# Patient Record
Sex: MALE | Race: WHITE | NOT HISPANIC OR LATINO | URBAN - NONMETROPOLITAN AREA
[De-identification: names, ages, dates, MRNs, and addresses within clinical notes are randomized per-mention and may not be internally consistent; named-entity substitution may affect disease eponyms.]

---

## 2020-07-21 ENCOUNTER — OFFICE VISIT (OUTPATIENT)
Dept: UROLOGY | Facility: CLINIC | Age: 35
End: 2020-07-21

## 2020-07-21 VITALS
HEART RATE: 59 BPM | WEIGHT: 145 LBS | DIASTOLIC BLOOD PRESSURE: 80 MMHG | SYSTOLIC BLOOD PRESSURE: 112 MMHG | OXYGEN SATURATION: 99 % | TEMPERATURE: 98 F

## 2020-07-21 DIAGNOSIS — E29.1 HYPOGONADISM MALE: Primary | ICD-10-CM

## 2020-07-21 PROCEDURE — 99204 OFFICE O/P NEW MOD 45 MIN: CPT | Performed by: UROLOGY

## 2020-07-21 NOTE — PROGRESS NOTES
Chief Complaint  Low Testosterone        HPI  Viki is a 35 y.o. male who is referred for evaluation of hypogonadism and low testosterone level.  He states that for the past several months he has noticed a significant drop in his energy strength and stamina of his recently found a testosterone level significantly less than 300.  He denies any ingestion of opioids that might possibly have been the etiology.    Vitals:    07/21/20 1336   BP: 112/80   Pulse: 59   Temp: 98 °F (36.7 °C)   SpO2: 99%       Past Medical History  History reviewed. No pertinent past medical history.    Past Surgical History  Past Surgical History:   Procedure Laterality Date   • KNEE ACL RECONSTRUCTION         Medications  currently has no medications in their medication list.      Allergies  No Known Allergies    Social History  Social History     Socioeconomic History   • Marital status: Unknown     Spouse name: Not on file   • Number of children: Not on file   • Years of education: Not on file   • Highest education level: Not on file   Tobacco Use   • Smoking status: Never Smoker   • Smokeless tobacco: Never Used   Substance and Sexual Activity   • Alcohol use: Not Currently     Frequency: Never   • Drug use: Defer   • Sexual activity: Defer       Family History  He has no family history of bladder or kidney cancer  He has no family history of kidney stones      AUA Symptom Score:      Review of Systems  Review of Systems   Constitutional: Positive for fatigue. Negative for activity change, appetite change, chills, fever, unexpected weight gain and unexpected weight loss.   Respiratory: Negative for apnea, cough, chest tightness, shortness of breath, wheezing and stridor.    Cardiovascular: Negative for chest pain, palpitations and leg swelling.   Gastrointestinal: Negative for abdominal distention, abdominal pain, anal bleeding, blood in stool, constipation, diarrhea, nausea, rectal pain, vomiting, GERD and indigestion.   Genitourinary:  Negative for decreased libido, decreased urine volume, difficulty urinating, discharge, dysuria, flank pain, frequency, genital sores, hematuria, nocturia, penile pain, erectile dysfunction, penile swelling, scrotal swelling, testicular pain, urgency and urinary incontinence.   Musculoskeletal: Negative for back pain and joint swelling.   Neurological: Negative for tremors, seizures, speech difficulty, weakness and numbness.   Psychiatric/Behavioral: Negative for agitation, decreased concentration, sleep disturbance, depressed mood and stress. The patient is not nervous/anxious.        Physical Exam  Physical Exam   Constitutional: He is oriented to person, place, and time. He appears well-developed and well-nourished.   HENT:   Head: Normocephalic and atraumatic.   Neck: Normal range of motion.   Pulmonary/Chest: Effort normal. No respiratory distress.   Abdominal: Soft. He exhibits no distension and no mass. There is no tenderness. No hernia. Hernia confirmed negative in the right inguinal area and confirmed negative in the left inguinal area.   Genitourinary: Testes normal and penis normal. Circumcised.   Musculoskeletal: Normal range of motion.   Lymphadenopathy:     He has no cervical adenopathy. No inguinal adenopathy noted on the right or left side.   Neurological: He is alert and oriented to person, place, and time.   Skin: Skin is warm and dry.   Psychiatric: He has a normal mood and affect. His behavior is normal.   Vitals reviewed.      Labs Recent and today in the office:  No results found for this or any previous visit.      Assessment & Plan  Hypogonadism: I recommended a baseline analysis of his pituitary gonadal axis and a trial of Clomid to see if we can stimulate endogenous production of testosterone.  He has 2 children but at his age may want more and testosterone will suppress his sperm count as well as endogenous production.  The patient is desperate to see a quick result so we will go with a  full 50 mg daily rather than usual 25 with a progression after 3 months.  If this is ineffective or undesirable for any reason as well as expense he may elect to switch over to testosterone supplement when he returns in 3 months.  The necessity of close monitoring for toxicity of therapy is reviewed in detail along with the possibilities of treating estradiol conversion or polycythemia.

## 2020-07-23 LAB
ERYTHROCYTE [DISTWIDTH] IN BLOOD BY AUTOMATED COUNT: 12.3 % (ref 12.3–15.4)
ESTRADIOL SERPL-MCNC: 14.2 PG/ML (ref 7.6–42.6)
HCT VFR BLD AUTO: 40 % (ref 37.5–51)
HGB BLD-MCNC: 13.8 G/DL (ref 13–17.7)
LH SERPL-ACNC: 5.2 MIU/ML (ref 1.7–8.6)
MCH RBC QN AUTO: 28.9 PG (ref 26.6–33)
MCHC RBC AUTO-ENTMCNC: 34.5 G/DL (ref 31.5–35.7)
MCV RBC AUTO: 83.9 FL (ref 79–97)
PLATELET # BLD AUTO: 338 10*3/MM3 (ref 140–450)
PROLACTIN SERPL-MCNC: 8 NG/ML (ref 4–15.2)
RBC # BLD AUTO: 4.77 10*6/MM3 (ref 4.14–5.8)
TESTOST FREE SERPL-MCNC: 10.2 PG/ML (ref 8.7–25.1)
TESTOST SERPL-MCNC: 393 NG/DL (ref 264–916)
WBC # BLD AUTO: 6.48 10*3/MM3 (ref 3.4–10.8)

## 2021-01-12 ENCOUNTER — TRANSCRIBE ORDERS (OUTPATIENT)
Dept: ADMINISTRATIVE | Facility: HOSPITAL | Age: 36
End: 2021-01-12

## 2021-01-12 DIAGNOSIS — S83.207A ACUTE MENISCAL TEAR OF KNEE, LEFT, INITIAL ENCOUNTER: Primary | ICD-10-CM

## 2024-02-19 ENCOUNTER — TRANSCRIBE ORDERS (OUTPATIENT)
Dept: ADMINISTRATIVE | Facility: HOSPITAL | Age: 39
End: 2024-02-19
Payer: COMMERCIAL

## 2024-02-19 DIAGNOSIS — R19.7 DIARRHEA, UNSPECIFIED TYPE: ICD-10-CM

## 2024-02-19 DIAGNOSIS — R10.84 ABDOMINAL PAIN, GENERALIZED: Primary | ICD-10-CM

## 2024-02-19 DIAGNOSIS — Z11.3 SCREEN FOR STD (SEXUALLY TRANSMITTED DISEASE): ICD-10-CM

## 2024-04-10 ENCOUNTER — HOSPITAL ENCOUNTER (OUTPATIENT)
Dept: ULTRASOUND IMAGING | Facility: HOSPITAL | Age: 39
Discharge: HOME | End: 2024-04-10
Payer: COMMERCIAL

## 2024-04-10 DIAGNOSIS — Z11.3 SCREEN FOR STD (SEXUALLY TRANSMITTED DISEASE): ICD-10-CM

## 2024-04-10 DIAGNOSIS — R19.7 DIARRHEA, UNSPECIFIED TYPE: ICD-10-CM

## 2024-04-10 DIAGNOSIS — R10.84 ABDOMINAL PAIN, GENERALIZED: ICD-10-CM

## 2024-04-10 PROCEDURE — 76700 US EXAM ABDOM COMPLETE: CPT

## 2024-05-06 ENCOUNTER — OFFICE VISIT (OUTPATIENT)
Dept: SURGERY | Facility: CLINIC | Age: 39
End: 2024-05-06
Payer: COMMERCIAL

## 2024-05-06 VITALS
BODY MASS INDEX: 24.99 KG/M2 | TEMPERATURE: 97.8 F | SYSTOLIC BLOOD PRESSURE: 116 MMHG | WEIGHT: 150 LBS | DIASTOLIC BLOOD PRESSURE: 70 MMHG | HEIGHT: 65 IN | OXYGEN SATURATION: 98 % | HEART RATE: 67 BPM

## 2024-05-06 DIAGNOSIS — K52.9 CHRONIC DIARRHEA: ICD-10-CM

## 2024-05-06 DIAGNOSIS — R10.11 RIGHT UPPER QUADRANT ABDOMINAL PAIN: ICD-10-CM

## 2024-05-06 DIAGNOSIS — K80.20 CALCULUS OF GALLBLADDER WITHOUT CHOLECYSTITIS WITHOUT OBSTRUCTION: Primary | ICD-10-CM

## 2024-05-06 PROCEDURE — 99204 OFFICE O/P NEW MOD 45 MIN: CPT | Performed by: SURGERY

## 2024-05-31 ENCOUNTER — TELEPHONE (OUTPATIENT)
Dept: SURGERY | Facility: CLINIC | Age: 39
End: 2024-05-31
Payer: COMMERCIAL

## 2024-06-18 ENCOUNTER — OUTSIDE FACILITY SERVICE (OUTPATIENT)
Dept: SURGERY | Facility: CLINIC | Age: 39
End: 2024-06-18
Payer: COMMERCIAL

## 2024-06-18 DIAGNOSIS — K81.9 CHOLECYSTITIS: Primary | ICD-10-CM

## 2024-06-18 RX ORDER — HYDROCODONE BITARTRATE AND ACETAMINOPHEN 5; 325 MG/1; MG/1
1 TABLET ORAL EVERY 6 HOURS PRN
Qty: 14 TABLET | Refills: 0 | Status: SHIPPED | OUTPATIENT
Start: 2024-06-18

## 2024-07-01 ENCOUNTER — OFFICE VISIT (OUTPATIENT)
Dept: SURGERY | Facility: CLINIC | Age: 39
End: 2024-07-01
Payer: COMMERCIAL

## 2024-07-01 VITALS
SYSTOLIC BLOOD PRESSURE: 106 MMHG | TEMPERATURE: 97.8 F | BODY MASS INDEX: 24.99 KG/M2 | OXYGEN SATURATION: 98 % | WEIGHT: 150 LBS | HEART RATE: 92 BPM | HEIGHT: 65 IN | DIASTOLIC BLOOD PRESSURE: 62 MMHG

## 2024-07-01 DIAGNOSIS — Z48.89 POSTOPERATIVE VISIT: Primary | ICD-10-CM

## 2024-07-01 PROCEDURE — 99024 POSTOP FOLLOW-UP VISIT: CPT | Performed by: SURGERY

## 2024-09-09 ENCOUNTER — OFFICE VISIT (OUTPATIENT)
Dept: PSYCHIATRY | Facility: CLINIC | Age: 39
End: 2024-09-09
Payer: COMMERCIAL

## 2024-09-09 VITALS
HEIGHT: 65 IN | HEART RATE: 76 BPM | BODY MASS INDEX: 23.49 KG/M2 | SYSTOLIC BLOOD PRESSURE: 122 MMHG | WEIGHT: 141 LBS | OXYGEN SATURATION: 98 % | DIASTOLIC BLOOD PRESSURE: 84 MMHG

## 2024-09-09 DIAGNOSIS — F51.01 PRIMARY INSOMNIA: ICD-10-CM

## 2024-09-09 DIAGNOSIS — Z79.899 MEDICATION MANAGEMENT: ICD-10-CM

## 2024-09-09 DIAGNOSIS — F90.2 ATTENTION DEFICIT HYPERACTIVITY DISORDER, COMBINED TYPE: Primary | ICD-10-CM

## 2024-09-09 PROCEDURE — 90792 PSYCH DIAG EVAL W/MED SRVCS: CPT | Performed by: REGISTERED NURSE

## 2024-09-09 RX ORDER — METHYLPHENIDATE HYDROCHLORIDE 20 MG/1
CAPSULE ORAL
Qty: 42 CAPSULE | Refills: 0 | Status: SHIPPED | OUTPATIENT
Start: 2024-09-09 | End: 2024-09-10 | Stop reason: SDUPTHER

## 2024-09-09 NOTE — PROGRESS NOTES
New Patient Office Visit      Patient Name: Evan Drew  : 1985   MRN: [id number]     Referring Provider: Provider, No Known    Chief Complaint:      ICD-10-CM ICD-9-CM   1. Attention deficit hyperactivity disorder, combined type  F90.2 314.01   2. Primary insomnia  F51.01 307.42   3. Medication management  Z79.899 V58.69        History of Present Illness:   Evan Drew is a 39 y.o. male who is here today for initial evaluation and to establish care for possible ADHD.  Patient states that over the last few years he has lost multiple jobs for missing work or being late due to unable to keep a schedule or stay motivated.  He is now currently working at the [employer] where the schedule is more flexible due to not having to clock in.  However, patient states he has been last 20 days out of the last 30 and was called out about it.  He also states that when he gets to work he will forget his name zac and have to go back home to get it.  He is having significant trouble with comprehension, having decreased focus and concentration, unable to complete task, unable to stay asleep or fall asleep easily, and states it is extremely difficult to get up in the mornings and get motivated to get his day started.  A couple of years ago he was on Adderall and stated this worked well but then went off of this medication and has tried to control his symptoms without medication.  However, he states it is impacting his work and family life.  He went through a divorce a few years ago and is now dating his significant other who he wants to propose to later this month.  He states that at times she will be talking to him and he will zone out and not comprehend anything that she said and this is causing her to have aggravation toward him.  He is concerned about this as well.  He tends to be impulsive with betting on sports and money management.  He denies any history of or recent SI/HI/AVH. Due to his reported symptoms he  took the CPT 3 on today's visit.  His CPT 3 results showed that he had a very high likelihood of having a disorder characterized by ADHD.  After discussing medication and treatment options for patient's symptoms it was decided to prescribe patient Jornay 20 mg every night x 14 days then increase to 40 mg every night thereafter for his ADHD symptoms.  This is in hopes to help patient be more motivated in the mornings and be able to get up easier to be on time for work and be more motivated at work.  This hopefully also will help patient stay asleep at night and get more rest to not be as fatigued the next day.  Patient denies any history of or current SI/HI/AVH at this time.  Patient was instructed on mechanism of action and side effects of medication when to seek medical treatment.  Patient was also instructed to monitor his appetite as stimulants can suppress his appetite and he is not in need of losing weight.  He was also instructed to monitor his blood pressure and heart rate while taking this medication due to the medication can increase his blood pressure and heart rate.  Patient verbalized understanding.  Will follow up with patient in 4 weeks for medication and symptom management.    Therapy: Not currently in therapy at this time and denies the need to be in therapy    Subjective      Review of Systems:   Review of Systems   Psychiatric/Behavioral:  Positive for decreased concentration, sleep disturbance and stress. The patient is nervous/anxious.        Past Medical History:   History reviewed. No pertinent past medical history.    Past Surgical History:   Past Surgical History:   Procedure Laterality Date    KNEE ACL RECONSTRUCTION      TONSILLECTOMY  1997       Family History:   Family History   Problem Relation Age of Onset    ADD / ADHD Maternal Uncle     ADD / ADHD Cousin        Family Psychiatric History:  ADHD, anxiety, depression    Screening Scores:   PHQ-9 Total Score: 5  TERI-7  Feeling nervous,  anxious or on edge: Not at all  Not being able to stop or control worrying: Not at all  Worrying too much about different things: Not at all  Trouble Relaxing: Several days  Being so restless that it is hard to sit still: Several days  Feeling afraid as if something awful might happen: Not at all  Becoming easily annoyed or irritable: Not at all  TERI 7 Total Score: 2  If you checked any problems, how difficult have these problems made it for you to do your work, take care of things at home, or get along with other people: Somewhat difficult    Psychiatric History:   Previous medications tried: Adderall  Inpatient admissions: Denies  History of suicide/self-harm attempts: Denies  Family history of suicide or attempts: Denies  Trauma/Abuse History: Denies any trauma or abuse history  Developmental History: States he had a normal development as a child    RISK ASSESSMENT:  Does patient currently have intent, plan, ideation for suicide?  Denies  Access to firearms or weapons: Yes but uses safety and precautions  History of homicidal ideation: Denies  Risk Taking/Impulsive Behavior (current or past): Yes describe: Impulsive with betting on sports  Protective factors: Family    Social History:  Highest level of education obtained: High school  Living situation: Currently lives in a home alone but gets his children on the weekends  Patient's Occupation: He is currently working on the [employer] side at the [employer]  Leisure and Recreation: Play sports, watch sports, going to festivals, hanging out with the kids and his soon-to-be fiancé  Support system: Mom, dad, brother  Illicit substance use: Denies  Alcohol use: Denies  Tobacco use: Denies    Legal History:   None    Medications:     Current Outpatient Medications:     Methylphenidate HCl ER, PM, (Jornay PM) 20 MG capsule sustained-release 24 hr, Take 1 capsule by mouth Every Night for 14 days, THEN 2 capsules Every Night for 14 days., Disp: 42 capsule, Rfl:  "0    Medication Considerations:  JANET reviewed and appropriate.      Allergies:   No Known Allergies    Objective     Physical Exam:  Vital Signs:   Vitals:    09/09/24 1047   BP: 122/84   Pulse: 76   SpO2: 98%   Weight: 64 kg (141 lb)   Height: 165.1 cm (65\")     Body mass index is 23.46 kg/m².     Mental Status Exam:   MENTAL STATUS EXAM   General Appearance:  Cleanly groomed and dressed  Eye Contact:  Good eye contact  Attitude:  Cooperative  Motor Activity:  Normal gait, posture and fidgety  Muscle Strength:  Normal  Speech:  Normal rate, tone, volume and hyper talkative  Language:  Spontaneous  Mood and affect:  Normal, pleasant and anxious  Hopelessness:  Denies  Loneliness: Denies  Thought Process:  Logical  Associations/ Thought Content:  No delusions  Hallucinations:  None  Suicidal Ideations:  Not present  Homicidal Ideation:  Not present  Sensorium:  Alert  Orientation:  Person, place, time and situation  Immediate Recall, Recent, and Remote Memory:  Intact  Attention Span/ Concentration:  Easily distracted  Fund of Knowledge:  Appropriate for age and educational level  Intellectual Functioning:  Average range  Insight:  Good  Judgement:  Good  Reliability:  Fair  Impulse Control:  Fair       Assessment / Plan      Visit Diagnosis/Orders Placed This Visit:  Diagnoses and all orders for this visit:    1. Attention deficit hyperactivity disorder, combined type (Primary)  -     Methylphenidate HCl ER, PM, (Jornay PM) 20 MG capsule sustained-release 24 hr; Take 1 capsule by mouth Every Night for 14 days, THEN 2 capsules Every Night for 14 days.  Dispense: 42 capsule; Refill: 0  -     Compliance Drug Analysis, Ur - Urine, Clean Catch    2. Primary insomnia    3. Medication management  -     Compliance Drug Analysis, Ur - Urine, Clean Catch         Functional Status: Moderate impairment    Prognosis: Good with ongoing treatment    Impression/Formulation:  Patient appeared alert and oriented.  Patient is " voluntarily requesting to begin psychiatric medication management at [facility].  Patient is receptive to assistance with maintaining a stable lifestyle.  Patient presents with history of     ICD-10-CM ICD-9-CM   1. Attention deficit hyperactivity disorder, combined type  F90.2 314.01   2. Primary insomnia  F51.01 307.42   3. Medication management  Z79.899 V58.69   .     Treatment Plan:   -Prescribed Jornay p.m. 20 mg nightly x 14 days then increase to 40 mg every night for ADHD.  Medication to be taken at bedtime.  -Obtained urine drug screen and controlled substance agreement  -Reviewed CPT 3 exam results that patient took on today's visit  -Follow up in 4 weeks      The JANET report, reviewed through PDMP, of the past 12 months were reviewed and is appropriate.  The patient/guardian reports taking the medication only as prescribed.  The patient/guardian denies any abuse or misuse of the medication.  The patient/guardian denies any other substance use or issues.  There are no apparent substance related issues.  The patient reports no side effects of the current medication usage.  The patient/guardian has reported significant improvement with medication usage and wishes to continue medication as prescribed.  The patient/guardian is appropriate to continue with current medication usage at this time.  Reinforced risks and side effects of medication usage, patient and/or guardian verbalize understanding in their own words and are in agreement with current plan.    The patient is being prescribed a controlled substance as part of the treatment plan. The patient/guardian has been educated of appropriate use of the medication(s), including risks and side effects such as insomnia, headache, exacerbation of tics, nervousness, irritability, overstimulation, tremor, dizziness, anorexia or change in appetite, nausea, dry mouth, constipation, diarrhea, weight loss, sexual dysfunction, psychotic  episodes, seizures, palpitations, tachycardia, hypertension, rare activation (activation of hypomania, francisco, and/or suicidal ideations), cardiovascular adverse effects including sudden death (especially patients with pre-existing structural abnormalities often associated with a family history of cardiac disease), may slow normal growth in children, weight gain is reported but not expected, sedation is possible but activation is much more common, metabolic adversities, and overdose among others. Patient/guardian is also informed that the medication is to be used by the patient only, the medication is to be used only as directed, and the medication should not be combined with other substances unless directed by a Provider/Prescriber. The patient/guardian verbalized understanding and agreement with this in their own words, and wish to continue with current treatment plan.    GOALS:  Short Term Goals: Patient will be compliant with medication, and patient will have no significant medication related side effects.  Patient will be engaged in psychotherapy as indicated.  Patient will report subjective improvement of symptoms.  Long term goals: To stabilize mood and treat/improve subjective symptoms, the patient will stay out of the hospital, the patient will be at an optimal level of functioning, and the patient will take all medications as prescribed.  The patient/guardian verbalized understanding and agreement with goals that were mutually set.     Patient will continue supportive psychotherapy efforts and medications as indicated. Clinic will obtain release of information for current treatment team for continuity of care as needed. Patient will contact this office, call 911 or present to the nearest emergency room should suicidal or homicidal ideations occur. Discussed medication options and treatment plan of prescribed medication(s) as well as the risks, benefits, and potential side effects. Patient acknowledged and  verbally consented to continue with current treatment plan and was educated on the importance of compliance with treatment and follow-up appointments.     Follow Up:   Return in about 4 weeks (around 10/7/2024) for Med Check.        JENNIFER Marmolejo  [facility]     This is electronically signed by JENNIFER Marmolejo  [unfilled] 11:56 EDT

## 2024-09-10 RX ORDER — METHYLPHENIDATE HYDROCHLORIDE 40 MG/1
40 CAPSULE ORAL NIGHTLY
Qty: 30 CAPSULE | Refills: 0 | Status: SHIPPED | OUTPATIENT
Start: 2024-09-25

## 2024-09-10 RX ORDER — METHYLPHENIDATE HYDROCHLORIDE 20 MG/1
20 CAPSULE ORAL NIGHTLY
Qty: 14 CAPSULE | Refills: 0 | Status: SHIPPED | OUTPATIENT
Start: 2024-09-10 | End: 2024-09-24

## 2024-09-16 LAB — DRUGS UR: NORMAL

## 2024-09-23 DIAGNOSIS — F90.2 ATTENTION DEFICIT HYPERACTIVITY DISORDER, COMBINED TYPE: ICD-10-CM

## 2024-09-23 RX ORDER — METHYLPHENIDATE HYDROCHLORIDE 40 MG/1
40 CAPSULE ORAL NIGHTLY
Qty: 30 CAPSULE | Refills: 0 | Status: SHIPPED | OUTPATIENT
Start: 2024-09-25

## 2024-10-07 ENCOUNTER — TELEMEDICINE (OUTPATIENT)
Dept: PSYCHIATRY | Facility: CLINIC | Age: 39
End: 2024-10-07
Payer: COMMERCIAL

## 2024-10-07 DIAGNOSIS — F41.1 GENERALIZED ANXIETY DISORDER: ICD-10-CM

## 2024-10-07 DIAGNOSIS — R53.83 FATIGUE, UNSPECIFIED TYPE: ICD-10-CM

## 2024-10-07 DIAGNOSIS — F90.2 ATTENTION DEFICIT HYPERACTIVITY DISORDER, COMBINED TYPE: Primary | ICD-10-CM

## 2024-10-07 PROCEDURE — 99214 OFFICE O/P EST MOD 30 MIN: CPT | Performed by: REGISTERED NURSE

## 2024-10-07 PROCEDURE — 96127 BRIEF EMOTIONAL/BEHAV ASSMT: CPT | Performed by: REGISTERED NURSE

## 2024-10-07 NOTE — PROGRESS NOTES
Video Visit      Patient Name: Evan Drew  : 1985   MRN: [id number]     Referring Provider: Provider, No Known    Chief Complaint:      ICD-10-CM ICD-9-CM   1. Attention deficit hyperactivity disorder, combined type  F90.2 314.01   2. Primary insomnia  F51.01 307.42        This provider is located at the [facility] (through [facility]), [address], [place] [address] using a secure [employer] Video Visit through [employer]. Patient is being seen remotely via telehealth video visit at their home address in [place], and stated they are in a secure environment for this session. The patient's condition being diagnosed/treated is appropriate for telemedicine. The provider identified herself as well as her credentials. The patient, and/or patients guardian, consent to be seen remotely, and when consent is given they understand that the consent allows for patient identifiable information to be sent to a third party as needed. They may refuse to be seen remotely at any time. The electronic data is encrypted and password protected, and the patient and/or guardian has been advised of the potential risks to privacy not withstanding such measures.    The patient has chosen to receive care today through a telehealth video visit. Do you consent to use a video/audio connection for your medical care today? Yes    History of Present Illness:   Evan Drew is a 39 y.o. male who is being seen by a video visit today for follow-up for medication and symptom management.  Patient states that he feels the Jornay 40 mg is going okay but that he stays tired during the day.  He does not notice any burst of energy or having more energy than normal.  He does state that he is able to get up and get to work on time but still continues to feel sluggish.  He states he has noticed a little improvement with his concentration and focus due to not having as significant  anxiety as he was before.  He has noticed little changes and improvements with the medication but nothing significant.  He denies having any adverse reactions to the medication or any increase in his heart rate or blood pressure.  He does state that he is still eating the same and being conscious that he does due to not wanting to lose further weight.  Instructed that he needs to continue with adequate nutrition and hydration.  He states that he is sleeping okay with the medication.  He denies any SI/HI/AVH.  He does state they have been extremely busy at work and this has helped keep his mind busy.  After discussing medication and treatment options it was decided to increase the Jornay to 60 mg capsule every night to see if this further improves his symptoms and to possibly help keep him from being so fatigued during the day.  Instructed the patient that if this increase in dose does not work that we will discuss changing his stimulant to a different medication at the next visit.  Instructed again on the adverse reactions and side effects of this medication and when to seek medical treatment.  Instructed with any suicidal thoughts with intent or plan to go to the nearest emergency room.  Patient instructed to continue to be mindful of eating and to continue to monitor his blood pressure and heart rate with this medication.  Patient verbalized understanding to all.  Patient will follow up with this provider in 6 weeks or sooner if needed for medication symptom management.  If patient has lost further weight on his next in person visit, medication may be discontinued.    Subjective      Review of Systems:   Review of Systems   Constitutional:  Positive for fatigue.   Psychiatric/Behavioral:  Positive for decreased concentration and sleep disturbance.        Screening Scores:   PHQ-9 Total Score: 3  TERI-7  Feeling nervous, anxious or on edge: Not at all  Not being able to stop or control worrying: Not at all  Worrying  too much about different things: Not at all  Trouble Relaxing: Not at all  Being so restless that it is hard to sit still: Not at all  Feeling afraid as if something awful might happen: Not at all  Becoming easily annoyed or irritable: Not at all  TERI 7 Total Score: 0  If you checked any problems, how difficult have these problems made it for you to do your work, take care of things at home, or get along with other people: Not difficult at all      RISK ASSESSMENT:  Patient denies any thoughts of suicide or intent today. Patient denies any suicidal or homicidal ideation today. Patient denies any high risk factors today.     Medications:     Current Outpatient Medications:     Methylphenidate HCl ER, PM, 60 MG capsule sustained-release 24 hr, Take 60 mg by mouth Every Night., Disp: 30 capsule, Rfl: 0    Medication Considerations:  JANET reviewed and appropriate.      Allergies:   No Known Allergies    Objective     Physical Exam:  Vital Signs: There were no vitals filed for this visit.  There is no height or weight on file to calculate BMI.     Mental Status Exam:   MENTAL STATUS EXAM   General Appearance:  Cleanly groomed and dressed  Eye Contact:  Good eye contact  Attitude:  Cooperative  Speech:  Normal rate, tone, volume  Language:  Spontaneous  Mood and affect:  Normal, pleasant  Hopelessness:  Denies  Loneliness: Denies  Thought Process:  Logical  Associations/ Thought Content:  No delusions  Hallucinations:  None  Suicidal Ideations:  Not present  Homicidal Ideation:  Not present  Sensorium:  Alert  Orientation:  Person, place, time and situation  Immediate Recall, Recent, and Remote Memory:  Intact  Attention Span/ Concentration:  Easily distracted  Fund of Knowledge:  Appropriate for age and educational level  Intellectual Functioning:  Average range  Insight:  Good  Judgement:  Good  Reliability:  Good  Impulse Control:  Fair         Assessment / Plan      Visit Diagnosis/Orders Placed This  Visit:  Diagnoses and all orders for this visit:    1. Attention deficit hyperactivity disorder, combined type (Primary)  -     Methylphenidate HCl ER, PM, 60 MG capsule sustained-release 24 hr; Take 60 mg by mouth Every Night.  Dispense: 30 capsule; Refill: 0    2. Primary insomnia         Functional Status: Moderate impairment    Prognosis: Good with ongoing treatment    Impression/Formulation:  Patient appeared alert and oriented.  Patient is voluntarily requesting to begin outpatient therapy at [facility].  Patient is receptive to assistance with maintaining a stable lifestyle.  Patient presents with history of     ICD-10-CM ICD-9-CM   1. Attention deficit hyperactivity disorder, combined type  F90.2 314.01   2. Primary insomnia  F51.01 307.42   .     Treatment Plan:   -Increase Jornay to 60 mg capsule at night  -Follow up in 6 weeks or sooner if needed      The JANET report, reviewed through PDMP, of the past 12 months were reviewed and is appropriate.  The patient/guardian reports taking the medication only as prescribed.  The patient/guardian denies any abuse or misuse of the medication.  The patient/guardian denies any other substance use or issues.  There are no apparent substance related issues.  The patient reports no side effects of the current medication usage.  The patient/guardian has reported significant improvement with medication usage and wishes to continue medication as prescribed.  The patient/guardian is appropriate to continue with current medication usage at this time.  Reinforced risks and side effects of medication usage, patient and/or guardian verbalize understanding in their own words and are in agreement with current plan.    GOALS:  Short Term Goals: Patient will be compliant with medication, and patient will have no significant medication related side effects.  Patient will be engaged in psychotherapy as indicated.  Patient will report subjective improvement  of symptoms.  Long term goals: To stabilize mood and treat/improve subjective symptoms, the patient will stay out of the hospital, the patient will be at an optimal level of functioning, and the patient will take all medications as prescribed.  The patient/guardian verbalized understanding and agreement with goals that were mutually set.     Patient will continue supportive psychotherapy efforts and medications as indicated. Clinic will obtain release of information for current treatment team for continuity of care as needed. Patient will contact this office, call 911 or present to the nearest emergency room should suicidal or homicidal ideations occur. Discussed medication options and treatment plan of prescribed medication(s) as well as the risks, benefits, and potential side effects. Patient ackowledged and verbally consented to continue with current treatment plan and was educated on the importance of compliance with treatment and follow-up appointments.     Follow Up:   Return in about 6 weeks (around 11/18/2024) for Med Check.      JENNIFER Marmolejo  [facility]     This is electronically signed by JENNIFER Marmolejo  [unfilled] 11:19 EDT

## 2024-11-15 ENCOUNTER — TELEPHONE (OUTPATIENT)
Dept: PSYCHIATRY | Facility: CLINIC | Age: 39
End: 2024-11-15
Payer: COMMERCIAL

## 2024-11-15 DIAGNOSIS — F90.2 ATTENTION DEFICIT HYPERACTIVITY DISORDER, COMBINED TYPE: Primary | ICD-10-CM

## 2024-11-15 RX ORDER — METHYLPHENIDATE HYDROCHLORIDE 80 MG/1
80 CAPSULE ORAL NIGHTLY
Qty: 15 CAPSULE | Refills: 0 | Status: SHIPPED | OUTPATIENT
Start: 2024-11-15 | End: 2024-11-18 | Stop reason: SDUPTHER

## 2024-11-15 NOTE — TELEPHONE ENCOUNTER
Patient called in asking if he could see provider today he is out of Jornay and would like to discuss switching to something else. He said he started this for helping him in mornings with getting up, motivation, and focus. He can tell its helped with focus but it is wearing off by 1 pm and he can feel it wearing off and gets very tired. Please advise if you can make a change Patient has appointment on Monday 11/18/2024.

## 2024-11-15 NOTE — TELEPHONE ENCOUNTER
Spoke to Patient no negative symptoms agreeable to try a increase on Jornay. Please send to [facility]

## 2024-11-18 ENCOUNTER — TELEMEDICINE (OUTPATIENT)
Dept: PSYCHIATRY | Facility: CLINIC | Age: 39
End: 2024-11-18
Payer: COMMERCIAL

## 2024-11-18 DIAGNOSIS — F90.2 ATTENTION DEFICIT HYPERACTIVITY DISORDER, COMBINED TYPE: Primary | ICD-10-CM

## 2024-11-18 DIAGNOSIS — F41.1 GENERALIZED ANXIETY DISORDER: ICD-10-CM

## 2024-11-18 PROCEDURE — 99214 OFFICE O/P EST MOD 30 MIN: CPT | Performed by: REGISTERED NURSE

## 2024-11-18 PROCEDURE — 96127 BRIEF EMOTIONAL/BEHAV ASSMT: CPT | Performed by: REGISTERED NURSE

## 2024-11-18 RX ORDER — METHYLPHENIDATE HYDROCHLORIDE 80 MG/1
80 CAPSULE ORAL NIGHTLY
Qty: 30 CAPSULE | Refills: 0 | Status: SHIPPED | OUTPATIENT
Start: 2024-11-18

## 2024-11-18 NOTE — PROGRESS NOTES
Video Visit      Patient Name: Evan Drew  : 1985   MRN: [id number]     Referring Provider: Provider, No Known    Chief Complaint:      ICD-10-CM ICD-9-CM   1. Attention deficit hyperactivity disorder, combined type  F90.2 314.01   2. Generalized anxiety disorder  F41.1 300.02        This provider is located at the [facility] (through [facility]), [address], [address], [place] [address] using a secure [employer] Video Visit through [employer]. Patient is being seen remotely via telehealth video visit at their home address in [place], and stated they are in a secure environment for this session. The patient's condition being diagnosed/treated is appropriate for telemedicine. The provider identified herself as well as her credentials. The patient, and/or patients guardian, consent to be seen remotely, and when consent is given they understand that the consent allows for patient identifiable information to be sent to a third party as needed. They may refuse to be seen remotely at any time. The electronic data is encrypted and password protected, and the patient and/or guardian has been advised of the potential risks to privacy not withstanding such measures.    The patient has chosen to receive care today through a telehealth video visit. Do you consent to use a video/audio connection for your medical care today? Yes    History of Present Illness:   Evan Drew is a 39 y.o. male who is being seen by a video visit today for medication symptom management.  Patient states that since increasing his medication to the Jornay 60 mg every night that he has noticed a improvement in his concentration and focus, mainly in the mornings.  However, he does state that he can tell it wears off around 1:00 in the afternoon.  He states that he also just recently got a verbal warning at work for being late 3 times.  We initially started the Jornay due to patient being  consistently late to work not being able to get up and get motivated of the mornings.  On today's visit, we discussed good sleeping habits with having a consistent bedtime and a consistent wake time.  We discussed not hitting snooze when the alarm clock goes off and training her body to get up at the time that is supposed to get up instead of laying in bed.  Patient states that he feels this is what is happening as he is hitting snooze consistently and going back to sleep and then sleeping through his next alarm which is making him late for work. He is going to work on not hitting snooze and training himself to have good sleeping habit and to wake up consistently around the same time every morning for work.  He does report that he is getting at least 8 hours sleep every night and falls asleep easily with no problem.  We also discussed about his weight management, he states he has not lost any weight since our last visit and states he is eating well and trying to stay hydrated.  He states he is being very conscious about making sure that he eats every few hours to maintain his weight while on this medication.  Patient also reports that he has not had any heart palpitations, chest pain, dizziness, and/or short of breath while on this medication.  He denies any adverse reactions or side effects to this medicine.  After we discussed medication and symptom management, due to patient's medication wearing off around 1:00 in the afternoon, his medication will be increased to Jornay 80 mg every night.  Instructed patient that with any increase in dosage that the risk of side effects increases as well.  He was instructed with any new or worsening symptoms to notify this provider.  He was also instructed that if this increase in dosage does not manage his symptoms effectively that we may need to discuss other medication options.  He was instructed with any thoughts of self-harm or suicidal ideations to go directly to the  emergency room.  He verbalized understanding to all.  He denies any recent or current SI/HI/AVH.    Subjective      Review of Systems:   Review of Systems   Psychiatric/Behavioral:  Positive for decreased concentration and stress.        Screening Scores:   PHQ-9 Total Score: (Patient-Rptd) 3  TERI-7  Feeling nervous, anxious or on edge: (Patient-Rptd) Not at all  Not being able to stop or control worrying: (Patient-Rptd) Not at all  Worrying too much about different things: (Patient-Rptd) Not at all  Trouble Relaxing: (Patient-Rptd) Not at all  Being so restless that it is hard to sit still: (Patient-Rptd) Not at all  Feeling afraid as if something awful might happen: (Patient-Rptd) Not at all  Becoming easily annoyed or irritable: (Patient-Rptd) Not at all  TERI 7 Total Score: (Patient-Rptd) 0  If you checked any problems, how difficult have these problems made it for you to do your work, take care of things at home, or get along with other people: (Patient-Rptd) Not difficult at all      RISK ASSESSMENT:  Patient denies any thoughts of suicide or intent today. Patient denies any suicidal or homicidal ideation today. Patient denies any high risk factors today.     Medications:     Current Outpatient Medications:     Methylphenidate HCl ER, PM, (Jornay PM) 80 MG capsule sustained-release 24 hr, Take 80 mg by mouth Every Night., Disp: 30 capsule, Rfl: 0    Medication Considerations:  JANET reviewed and appropriate.      Allergies:   No Known Allergies    Objective     Physical Exam:  Vital Signs: There were no vitals filed for this visit.  There is no height or weight on file to calculate BMI.     Mental Status Exam:   MENTAL STATUS EXAM   General Appearance:  Cleanly groomed and dressed  Eye Contact:  Good eye contact  Attitude:  Cooperative  Motor Activity:  Fidgety  Muscle Strength:  Normal  Speech:  Normal rate, tone, volume  Language:  Spontaneous  Mood and affect:  Normal, pleasant  Hopelessness:   Denies  Loneliness: Denies  Thought Process:  Logical  Associations/ Thought Content:  No delusions  Hallucinations:  None  Suicidal Ideations:  Not present  Homicidal Ideation:  Not present  Sensorium:  Alert  Orientation:  Person, place, time and situation  Immediate Recall, Recent, and Remote Memory:  Intact  Attention Span/ Concentration:  Easily distracted  Fund of Knowledge:  Appropriate for age and educational level  Intellectual Functioning:  Average range  Insight:  Good  Judgement:  Fair  Reliability:  Fair  Impulse Control:  Good         Assessment / Plan      Visit Diagnosis/Orders Placed This Visit:  Diagnoses and all orders for this visit:    1. Attention deficit hyperactivity disorder, combined type (Primary)  -     Methylphenidate HCl ER, PM, (Jornay PM) 80 MG capsule sustained-release 24 hr; Take 80 mg by mouth Every Night.  Dispense: 30 capsule; Refill: 0    2. Generalized anxiety disorder         Functional Status: Moderate impairment    Prognosis: Fair with ongoing treatment    Impression/Formulation:  Patient appeared alert and oriented.  Patient is voluntarily requesting to begin outpatient therapy at [facility].  Patient is receptive to assistance with maintaining a stable lifestyle.  Patient presents with history of     ICD-10-CM ICD-9-CM   1. Attention deficit hyperactivity disorder, combined type  F90.2 314.01   2. Generalized anxiety disorder  F41.1 300.02   .     Treatment Plan:   -Increase Jornay to 80 mg every night, prescription sent  -Follow-up in 6 weeks or sooner if needed for medication symptom management      The JANET report, reviewed through PDMP, of the past 12 months were reviewed and is appropriate.  The patient/guardian reports taking the medication only as prescribed.  The patient/guardian denies any abuse or misuse of the medication.  The patient/guardian denies any other substance use or issues.  There are no apparent substance related issues.   The patient reports no side effects of the current medication usage.  The patient/guardian has reported significant improvement with medication usage and wishes to continue medication as prescribed.  The patient/guardian is appropriate to continue with current medication usage at this time.  Reinforced risks and side effects of medication usage, patient and/or guardian verbalize understanding in their own words and are in agreement with current plan.    The patient is being prescribed a controlled substance as part of the treatment plan. The patient/guardian has been educated of appropriate use of the medication(s), including risks and side effects such as insomnia, headache, exacerbation of tics, nervousness, irritability, overstimulation, tremor, dizziness, anorexia or change in appetite, nausea, dry mouth, constipation, diarrhea, weight loss, sexual dysfunction, psychotic episodes, seizures, palpitations, tachycardia, hypertension, rare activation (activation of hypomania, francisco, and/or suicidal ideations), cardiovascular adverse effects including sudden death (especially patients with pre-existing structural abnormalities often associated with a family history of cardiac disease), may slow normal growth in children, weight gain is reported but not expected, sedation is possible but activation is much more common, metabolic adversities, and overdose among others. Patient/guardian is also informed that the medication is to be used by the patient only, the medication is to be used only as directed, and the medication should not be combined with other substances unless directed by a Provider/Prescriber. The patient/guardian verbalized understanding and agreement with this in their own words, and wish to continue with current treatment plan.    GOALS:  Short Term Goals: Patient will be compliant with medication, and patient will have no significant medication related side effects.  Patient will be engaged in  psychotherapy as indicated.  Patient will report subjective improvement of symptoms.  Long term goals: To stabilize mood and treat/improve subjective symptoms, the patient will stay out of the hospital, the patient will be at an optimal level of functioning, and the patient will take all medications as prescribed.  The patient/guardian verbalized understanding and agreement with goals that were mutually set.     Patient will continue supportive psychotherapy efforts and medications as indicated. Clinic will obtain release of information for current treatment team for continuity of care as needed. Patient will contact this office, call 911 or present to the nearest emergency room should suicidal or homicidal ideations occur. Discussed medication options and treatment plan of prescribed medication(s) as well as the risks, benefits, and potential side effects. Patient ackowledged and verbally consented to continue with current treatment plan and was educated on the importance of compliance with treatment and follow-up appointments.      Mode of Visit: Video   Location of patient: Work in [place], [place]  Location of provider: [facility]   You have chosen to receive care through a telehealth visit.   The patient has signed the video visit consent form.   The visit included audio and video interaction. No technical issues occurred during this visit.     I spent 34 minutes caring for Evan on this date of service. This time includes time spent by me in the following activities: preparing for the visit, counseling and educating the patient/family/caregiver, ordering medications, tests, or procedures, and documenting information in the medical record.       Follow Up:   Return in about 6 weeks (around 12/30/2024) for Med Check.      JENNIFER Marmolejo  [facility]     This is electronically signed by JENNIFER Marmolejo  11/18/2024 11:05 EST    Part of this note may be an electronic  transcription/translation of spoken language to printed text using the Dragon Dictation System.

## 2024-12-30 ENCOUNTER — TELEMEDICINE (OUTPATIENT)
Dept: PSYCHIATRY | Facility: CLINIC | Age: 39
End: 2024-12-30
Payer: COMMERCIAL

## 2024-12-30 DIAGNOSIS — F90.2 ATTENTION DEFICIT HYPERACTIVITY DISORDER, COMBINED TYPE: ICD-10-CM

## 2024-12-30 DIAGNOSIS — F41.1 GENERALIZED ANXIETY DISORDER: Primary | ICD-10-CM

## 2024-12-30 PROCEDURE — 96127 BRIEF EMOTIONAL/BEHAV ASSMT: CPT | Performed by: REGISTERED NURSE

## 2024-12-30 PROCEDURE — 99214 OFFICE O/P EST MOD 30 MIN: CPT | Performed by: REGISTERED NURSE

## 2024-12-30 RX ORDER — METHYLPHENIDATE HYDROCHLORIDE 80 MG/1
80 CAPSULE ORAL NIGHTLY
Qty: 30 CAPSULE | Refills: 0 | Status: SHIPPED | OUTPATIENT
Start: 2024-12-30

## 2024-12-30 NOTE — PROGRESS NOTES
Video Visit      Patient Name: Evan Drew  : 1985   MRN: [id number]     Referring Provider: Provider, No Known    Chief Complaint:      ICD-10-CM ICD-9-CM   1. Generalized anxiety disorder  F41.1 300.02   2. Attention deficit hyperactivity disorder, combined type  F90.2 314.01        This provider is located at the [facility] (through [facility]), [address], [address], [place] [address] using a secure [employer] Video Visit through [employer]. Patient is being seen remotely via telehealth video visit at their home address in [place], and stated they are in a secure environment for this session. The patient's condition being diagnosed/treated is appropriate for telemedicine. The provider identified herself as well as her credentials. The patient, and/or patients guardian, consent to be seen remotely, and when consent is given they understand that the consent allows for patient identifiable information to be sent to a third party as needed. They may refuse to be seen remotely at any time. The electronic data is encrypted and password protected, and the patient and/or guardian has been advised of the potential risks to privacy not withstanding such measures.    The patient has chosen to receive care today through a telehealth video visit. Do you consent to use a video/audio connection for your medical care today? Yes    History of Present Illness:   Evan Drew is a 39 y.o. male who is being seen by a video visit today for medication symptom management.  Patient states since we increased his Jornay to 80 mg every night that he is doing great.  He states he has been setting an alarm clock and taking it every night around 10 PM.  He states in the morning when he wakes up he feels more energized and has not been hitting the snooze button.  He also states he has been getting to work on time and has had good energy and able to concentrate and focus  better.  He does state the medication is wearing off between 3 and 5 PM.  He states he gets off work at 5 PM and has a 45-minute drive home.  He states when the medication is wearing off it makes this drive home rough.  He states he will use coping skills such as talking on the phone and listening to music to keep him awake and assist with this.  He states he is sleeping good and has had a good appetite especially around the holidays.  He denies losing any weight since being on the Jornay 80 mg.  He also states he is staying hydrated and trying to stay active.  He states his fiancé has even noticed a big difference in him.  He does state even on the days that he does not have to work he is still getting up early on the medication.  He does state that he has a bad habit of picking at his thumbs when he gets stressed with work or home life and has been more hyper aware of this.  He showed this provider his thumbs and it is noted that a layer of skin is pilled off of the padding of his bilateral thumbs where he has continued to rub.  Patient states he has done this since high school but he feels since being on this dose of Jornay it has made him more aware that he is doing this and it is starting to bother him.  He was instructed to place Band-Aids around his thumbs to help his thumbs heal and to keep him from rubbing any further skin off of his thumbs.  Instructed when he finds himself doing this to try to immediately stop this behavior.  We discussed how habits are hard to break and taking slow but steady steps to break the habit.  He states his stress level has been better but continues to have some stress at work and home.  He states that his fiancée has even caught him rubbing his thumbs with his fingers and has told him to stop as well and this has helped.  This will be further evaluated at his next visit.  He denies any other adverse reactions or side effects to his medication and denies having any shortness of  breath, dizziness, chest pain, and/or heart palpitations while taking this medication.  He is instructed to continue to have good appetite and stay hydrated as well as a good exercise regimen.  He was instructed if he has any new or worsening symptoms to notify this provider.  He was instructed with any thoughts of self-harm or suicidal ideations to go directly to the nearest emergency room.  He denies any current or recent SI/HI/AVH.  Patient verbalized understanding to all instruction.  He will be continued on Jornay 80 mg every night.  He will follow up with this provider in 3 months or sooner if needed for medication symptom management.    Subjective      Review of Systems:   Review of Systems   Psychiatric/Behavioral:  Positive for decreased concentration, positive for hyperactivity and stress.        Screening Scores:   PHQ-9 Total Score: (Patient-Rptd) 4  TERI-7  Feeling nervous, anxious or on edge: (Patient-Rptd) More than half the days  Not being able to stop or control worrying: (Patient-Rptd) Several days  Worrying too much about different things: (Patient-Rptd) Several days  Trouble Relaxing: (Patient-Rptd) More than half the days  Being so restless that it is hard to sit still: (Patient-Rptd) Several days  Feeling afraid as if something awful might happen: (Patient-Rptd) Not at all  Becoming easily annoyed or irritable: (Patient-Rptd) Several days  TERI 7 Total Score: (Patient-Rptd) 8  If you checked any problems, how difficult have these problems made it for you to do your work, take care of things at home, or get along with other people: (Patient-Rptd) Somewhat difficult      RISK ASSESSMENT:  Patient denies any thoughts of suicide or intent today. Patient denies any suicidal or homicidal ideation today. Patient denies any high risk factors today.     Medications:     Current Outpatient Medications:     Methylphenidate HCl ER, PM, (Jornay PM) 80 MG capsule sustained-release 24 hr, Take 80 mg by mouth  Every Night., Disp: 30 capsule, Rfl: 0    Medication Considerations:  JANET reviewed and appropriate.      Allergies:   No Known Allergies    Objective     Physical Exam:  Vital Signs: There were no vitals filed for this visit.  There is no height or weight on file to calculate BMI.     Mental Status Exam:   MENTAL STATUS EXAM   General Appearance:  Cleanly groomed and dressed  Eye Contact:  Good eye contact  Attitude:  Cooperative  Speech:  Normal rate, tone, volume  Language:  Spontaneous  Mood and affect:  Normal, pleasant  Hopelessness:  Denies  Loneliness: Denies  Thought Process:  Logical  Associations/ Thought Content:  No delusions  Hallucinations:  None  Suicidal Ideations:  Not present  Homicidal Ideation:  Not present  Sensorium:  Alert  Orientation:  Person, place, time and situation  Immediate Recall, Recent, and Remote Memory:  Intact  Attention Span/ Concentration:  Easily distracted  Fund of Knowledge:  Appropriate for age and educational level  Intellectual Functioning:  Average range  Insight:  Good  Judgement:  Good  Reliability:  Good  Impulse Control:  Good         Assessment / Plan      Visit Diagnosis/Orders Placed This Visit:  Diagnoses and all orders for this visit:    1. Generalized anxiety disorder (Primary)    2. Attention deficit hyperactivity disorder, combined type  -     Methylphenidate HCl ER, PM, (Jornay PM) 80 MG capsule sustained-release 24 hr; Take 80 mg by mouth Every Night.  Dispense: 30 capsule; Refill: 0         Functional Status: Moderate impairment    Prognosis: Fair with ongoing treatment    Impression/Formulation:  Patient appeared alert and oriented.  Patient is voluntarily requesting to begin outpatient therapy at [facility].  Patient is receptive to assistance with maintaining a stable lifestyle.  Patient presents with history of     ICD-10-CM ICD-9-CM   1. Generalized anxiety disorder  F41.1 300.02   2. Attention deficit hyperactivity  disorder, combined type  F90.2 314.01   .     Treatment Plan:   -Continue her Jornay p.m. 80 mg capsule every night, sent refill  -Follow-up in 3 months      The JANET report, reviewed through PDMP, of the past 12 months were reviewed and is appropriate.  The patient/guardian reports taking the medication only as prescribed.  The patient/guardian denies any abuse or misuse of the medication.  The patient/guardian denies any other substance use or issues.  There are no apparent substance related issues.  The patient reports no side effects of the current medication usage.  The patient/guardian has reported significant improvement with medication usage and wishes to continue medication as prescribed.  The patient/guardian is appropriate to continue with current medication usage at this time.  Reinforced risks and side effects of medication usage, patient and/or guardian verbalize understanding in their own words and are in agreement with current plan.    The patient is being prescribed a controlled substance as part of the treatment plan. The patient/guardian has been educated of appropriate use of the medication(s), including risks and side effects such as insomnia, headache, exacerbation of tics, nervousness, irritability, overstimulation, tremor, dizziness, anorexia or change in appetite, nausea, dry mouth, constipation, diarrhea, weight loss, sexual dysfunction, psychotic episodes, seizures, palpitations, tachycardia, hypertension, rare activation (activation of hypomania, francisco, and/or suicidal ideations), cardiovascular adverse effects including sudden death (especially patients with pre-existing structural abnormalities often associated with a family history of cardiac disease), may slow normal growth in children, weight gain is reported but not expected, sedation is possible but activation is much more common, metabolic adversities, and overdose among others. Patient/guardian is also informed that the  medication is to be used by the patient only, the medication is to be used only as directed, and the medication should not be combined with other substances unless directed by a Provider/Prescriber. The patient/guardian verbalized understanding and agreement with this in their own words, and wish to continue with current treatment plan.    GOALS:  Short Term Goals: Patient will be compliant with medication, and patient will have no significant medication related side effects.  Patient will be engaged in psychotherapy as indicated.  Patient will report subjective improvement of symptoms.  Long term goals: To stabilize mood and treat/improve subjective symptoms, the patient will stay out of the hospital, the patient will be at an optimal level of functioning, and the patient will take all medications as prescribed.  The patient/guardian verbalized understanding and agreement with goals that were mutually set.     Patient will continue supportive psychotherapy efforts and medications as indicated. Clinic will obtain release of information for current treatment team for continuity of care as needed. Patient will contact this office, call 911 or present to the nearest emergency room should suicidal or homicidal ideations occur. Discussed medication options and treatment plan of prescribed medication(s) as well as the risks, benefits, and potential side effects. Patient ackowledged and verbally consented to continue with current treatment plan and was educated on the importance of compliance with treatment and follow-up appointments.     I spent 30 minutes caring for Evan on this date of service. This time includes time spent by me in the following activities: preparing for the visit, performing a medically appropriate examination and/or evaluation, counseling and educating the patient/family/caregiver, ordering medications, tests, or procedures, and documenting information in the medical record.       Follow Up:    Return in about 3 months (around 3/30/2025) for Med Check.      JENNIFER Marmolejo  [facility]     This is electronically signed by JENNIFER Marmolejo  12 /30/2024 12:27 EST    Part of this note may be an electronic transcription/translation of spoken language to printed text using the Dragon Dictation System.

## 2025-02-03 DIAGNOSIS — F90.2 ATTENTION DEFICIT HYPERACTIVITY DISORDER, COMBINED TYPE: ICD-10-CM

## 2025-02-03 RX ORDER — METHYLPHENIDATE HYDROCHLORIDE 80 MG/1
80 CAPSULE ORAL NIGHTLY
Qty: 30 CAPSULE | Refills: 0 | Status: SHIPPED | OUTPATIENT
Start: 2025-02-03 | End: 2025-02-03 | Stop reason: SDUPTHER

## 2025-02-03 NOTE — TELEPHONE ENCOUNTER
Rx Refill Note  Requested Prescriptions     Pending Prescriptions Disp Refills    Methylphenidate HCl ER, PM, (Jornay PM) 80 MG capsule sustained-release 24 hr 30 capsule 0     Sig: Take 80 mg by mouth Every Night.      Last office visit with prescribing clinician: 9/9/2024   Last telemedicine visit with prescribing clinician: 12/30/2024   Next office visit with prescribing clinician: 3/31/2025                         Would you like a call back once the refill request has been completed: [] Yes [] No    If the office needs to give you a call back, can they leave a voicemail: [] Yes [] No    Omega No MA  02/03/25, 11:26 EST

## 2025-02-04 RX ORDER — METHYLPHENIDATE HYDROCHLORIDE 80 MG/1
80 CAPSULE ORAL NIGHTLY
Qty: 30 CAPSULE | Refills: 0 | Status: SHIPPED | OUTPATIENT
Start: 2025-02-04

## 2025-02-04 NOTE — TELEPHONE ENCOUNTER
Sent thru [employer]. Pt will need sent to [facility] in [place] instead of [facility]. Called and left message to cancel order with [employer].

## 2025-02-27 ENCOUNTER — TELEMEDICINE (OUTPATIENT)
Dept: PSYCHIATRY | Facility: CLINIC | Age: 40
End: 2025-02-27
Payer: COMMERCIAL

## 2025-02-27 DIAGNOSIS — F90.2 ATTENTION DEFICIT HYPERACTIVITY DISORDER, COMBINED TYPE: Primary | ICD-10-CM

## 2025-02-27 DIAGNOSIS — F41.1 GENERALIZED ANXIETY DISORDER: ICD-10-CM

## 2025-02-27 DIAGNOSIS — F51.01 PRIMARY INSOMNIA: ICD-10-CM

## 2025-02-27 RX ORDER — DEXTROAMPHETAMINE SACCHARATE, AMPHETAMINE ASPARTATE, DEXTROAMPHETAMINE SULFATE AND AMPHETAMINE SULFATE 2.5; 2.5; 2.5; 2.5 MG/1; MG/1; MG/1; MG/1
10 TABLET ORAL 2 TIMES DAILY
Qty: 30 TABLET | Refills: 0 | Status: SHIPPED | OUTPATIENT
Start: 2025-02-27

## 2025-02-27 NOTE — PROGRESS NOTES
Video Visit      Patient Name: Evan Drew  : 1985   MRN: [id number]     Referring Provider: Provider, No Known    Chief Complaint:      ICD-10-CM ICD-9-CM   1. Attention deficit hyperactivity disorder, combined type  F90.2 314.01   2. Generalized anxiety disorder  F41.1 300.02   3. Primary insomnia  F51.01 307.42          This provider is located at the [facility] (through [facility]), [address] [place] [address], [facility], [address], [place] [address] using a secure [employer] Video Visit through [employer]. Patient is being seen remotely via telehealth video visit at their home address in [place], and stated they are in a secure environment for this session. The patient's condition being diagnosed/treated is appropriate for telemedicine. The provider identified herself as well as her credentials. The patient, and/or patients guardian, consent to be seen remotely, and when consent is given they understand that the consent allows for patient identifiable information to be sent to a third party as needed. They may refuse to be seen remotely at any time. The electronic data is encrypted and password protected, and the patient and/or guardian has been advised of the potential risks to privacy not withstanding such measures.    The patient has chosen to receive care today through a telehealth video visit. Do you consent to use a video/audio connection for your medical care today? Yes    History of Present Illness:   Evan Drew is a 39 y.o. male who is being seen by a video visit today for medication symptom management.  Patient is currently endorsing being extremely anxious the past month and almost feeling paranoid.  He states that this is affecting his relationship with his significant other and states he seems to be nitpicking everything with her and is not able to let things go.  He states he has not been sleeping very well and feels that his anxiety has extremely elevated  over the last month being on the Jornay 80 mg.  We discussed decreasing the dose of the Jornay but he states that it does not help him with his concentration and focus and at work with the decreased dose but he feels that the 80 mg is too high of a dose.  Patient is requesting to switch his stimulant back to Adderall that he was on prior to seeing this provider.  He states he felt Adderall was more effective and did not make him feel as anxious as he has been in the past month.  He does state the Jornay has helped him get up and get to work early and he has not been late since being on this high dose of Jornay.  He also understands that the Adderall will not help him get up in the mornings and that he will have to work on his sleep-wake cycle and sleep hygiene to be able to get to sleep and then wake up when he needs to.  Patient states he cannot take anything to go to sleep because and he really will not wake up and he will oversleep for work and get in trouble.  Patient is currently rating his anxiety as a 8.5 out of a 10 on the Likert scale with 1 being the best and 10 being the worst.  He currently denies any depression at this time.  He denies having any other side effects from his stimulant such as chest pain, shortness of breath, dizziness, and/or heart palpitations.  He does state that his appetite is normal and he is trying to stay hydrated as well.  After we further discussed medication and symptom management, his Jornay will be discontinued and patient will be started on Adderall 10 mg twice daily for the next 2 weeks.  Patient is to call at the end of that 2 weeks to notify this provider how he is tolerating this medication and if a increase or decrease in his dose is warranted at that time.  He verbalized understanding to this.  He was instructed on the mechanism of action and side effects of this medication and when to seek medical treatment. He is instructed to continue to have good appetite and stay  hydrated as well as a good exercise regimen.  He was instructed if he has any new or worsening symptoms to notify this provider.  He was instructed with any thoughts of self-harm or suicidal ideations to go directly to the nearest emergency room.  He denies any current or recent SI/HI/AVH.  Patient verbalized understanding to all instruction. He will follow up with this provider in 6 weeks or sooner if needed for medication symptom management.    Subjective      Review of Systems:   Review of Systems   Psychiatric/Behavioral:  Positive for decreased concentration, positive for hyperactivity and stress. The patient is nervous/anxious.        Screening Scores:   PHQ-9 Total Score: (Patient-Rptd) 2  TERI-7  Feeling nervous, anxious or on edge: (Patient-Rptd) More than half the days  Not being able to stop or control worrying: (Patient-Rptd) More than half the days  Worrying too much about different things: (Patient-Rptd) More than half the days  Trouble Relaxing: (Patient-Rptd) More than half the days  Being so restless that it is hard to sit still: (Patient-Rptd) Several days  Feeling afraid as if something awful might happen: (Patient-Rptd) More than half the days  Becoming easily annoyed or irritable: (Patient-Rptd) Several days  TERI 7 Total Score: (Patient-Rptd) 12  If you checked any problems, how difficult have these problems made it for you to do your work, take care of things at home, or get along with other people: (Patient-Rptd) Very difficult      RISK ASSESSMENT:  Patient denies any thoughts of suicide or intent today. Patient denies any suicidal or homicidal ideation today. Patient denies any high risk factors today.     Medications:     Current Outpatient Medications:     amphetamine-dextroamphetamine (ADDERALL) 10 MG tablet, Take 1 tablet by mouth 2 (Two) Times a Day. Take 1st dose in the morning and take the 2nd dose around 1-2 pm in the afternoon., Disp: 30 tablet, Rfl: 0    Methylphenidate HCl ER, PM,  (Jornay PM) 80 MG capsule sustained-release 24 hr, Take 80 mg by mouth Every Night., Disp: 30 capsule, Rfl: 0    Medication Considerations:  JANET reviewed and appropriate.      Allergies:   No Known Allergies    Objective     Physical Exam:  Vital Signs: There were no vitals filed for this visit.  There is no height or weight on file to calculate BMI.     Mental Status Exam:   MENTAL STATUS EXAM   General Appearance:  Cleanly groomed and dressed  Eye Contact:  Good eye contact  Attitude:  Cooperative  Speech:  Normal rate, tone, volume  Language:  Spontaneous  Mood and affect:  Normal, pleasant  Hopelessness:  Denies  Loneliness: Denies  Thought Process:  Logical  Associations/ Thought Content:  No delusions  Hallucinations:  None  Suicidal Ideations:  Not present  Homicidal Ideation:  Not present  Sensorium:  Alert  Orientation:  Person, place, time and situation  Immediate Recall, Recent, and Remote Memory:  Intact  Attention Span/ Concentration:  Easily distracted  Fund of Knowledge:  Appropriate for age and educational level  Intellectual Functioning:  Average range  Insight:  Good  Judgement:  Good  Reliability:  Good  Impulse Control:  Good         Assessment / Plan      Visit Diagnosis/Orders Placed This Visit:  Diagnoses and all orders for this visit:    1. Attention deficit hyperactivity disorder, combined type (Primary)  -     amphetamine-dextroamphetamine (ADDERALL) 10 MG tablet; Take 1 tablet by mouth 2 (Two) Times a Day. Take 1st dose in the morning and take the 2nd dose around 1-2 pm in the afternoon.  Dispense: 30 tablet; Refill: 0    2. Generalized anxiety disorder    3. Primary insomnia           Functional Status: Moderate impairment    Prognosis: Fair with ongoing treatment    Impression/Formulation:  Patient appeared alert and oriented.  Patient is voluntarily requesting to begin outpatient therapy at [facility].  Patient is receptive to assistance with maintaining a  stable lifestyle.  Patient presents with history of     ICD-10-CM ICD-9-CM   1. Attention deficit hyperactivity disorder, combined type  F90.2 314.01   2. Generalized anxiety disorder  F41.1 300.02   3. Primary insomnia  F51.01 307.42     .     Treatment Plan:   -Discontinue Jornay  -Prescribe Adderall 10 mg twice daily x 2 weeks  -Follow-up in 6 weeks      The JANET report, reviewed through PDMP, of the past 12 months were reviewed and is appropriate.  The patient/guardian reports taking the medication only as prescribed.  The patient/guardian denies any abuse or misuse of the medication.  The patient/guardian denies any other substance use or issues.  There are no apparent substance related issues.  The patient reports no side effects of the current medication usage.  The patient/guardian has reported significant improvement with medication usage and wishes to continue medication as prescribed.  The patient/guardian is appropriate to continue with current medication usage at this time.  Reinforced risks and side effects of medication usage, patient and/or guardian verbalize understanding in their own words and are in agreement with current plan.    The patient is being prescribed a controlled substance as part of the treatment plan. The patient/guardian has been educated of appropriate use of the medication(s), including risks and side effects such as insomnia, headache, exacerbation of tics, nervousness, irritability, overstimulation, tremor, dizziness, anorexia or change in appetite, nausea, dry mouth, constipation, diarrhea, weight loss, sexual dysfunction, psychotic episodes, seizures, palpitations, tachycardia, hypertension, rare activation (activation of hypomania, francisco, and/or suicidal ideations), cardiovascular adverse effects including sudden death (especially patients with pre-existing structural abnormalities often associated with a family history of cardiac disease), may slow normal growth in  children, weight gain is reported but not expected, sedation is possible but activation is much more common, metabolic adversities, and overdose among others. Patient/guardian is also informed that the medication is to be used by the patient only, the medication is to be used only as directed, and the medication should not be combined with other substances unless directed by a Provider/Prescriber. The patient/guardian verbalized understanding and agreement with this in their own words, and wish to continue with current treatment plan.    GOALS:  Short Term Goals: Patient will be compliant with medication, and patient will have no significant medication related side effects.  Patient will be engaged in psychotherapy as indicated.  Patient will report subjective improvement of symptoms.  Long term goals: To stabilize mood and treat/improve subjective symptoms, the patient will stay out of the hospital, the patient will be at an optimal level of functioning, and the patient will take all medications as prescribed.  The patient/guardian verbalized understanding and agreement with goals that were mutually set.     Patient will continue supportive psychotherapy efforts and medications as indicated. Clinic will obtain release of information for current treatment team for continuity of care as needed. Patient will contact this office, call 911 or present to the nearest emergency room should suicidal or homicidal ideations occur. Discussed medication options and treatment plan of prescribed medication(s) as well as the risks, benefits, and potential side effects. Patient ackowledged and verbally consented to continue with current treatment plan and was educated on the importance of compliance with treatment and follow-up appointments.         Follow Up:   Return in about 6 weeks (around 4/10/2025) for Med Check.      JENNIFER Marmolejo  [facility]     This is electronically signed by Cait  Brad, JENNIFER  02/27/2025 16:42 EST    Part of this note may be an electronic transcription/translation of spoken language to printed text using the Dragon Dictation System.

## 2025-03-13 ENCOUNTER — TELEPHONE (OUTPATIENT)
Dept: PSYCHIATRY | Facility: CLINIC | Age: 40
End: 2025-03-13
Payer: COMMERCIAL

## 2025-03-13 DIAGNOSIS — F90.2 ATTENTION DEFICIT HYPERACTIVITY DISORDER, COMBINED TYPE: ICD-10-CM

## 2025-03-13 RX ORDER — DEXTROAMPHETAMINE SACCHARATE, AMPHETAMINE ASPARTATE, DEXTROAMPHETAMINE SULFATE AND AMPHETAMINE SULFATE 2.5; 2.5; 2.5; 2.5 MG/1; MG/1; MG/1; MG/1
10 TABLET ORAL 2 TIMES DAILY
Qty: 60 TABLET | Refills: 0 | Status: SHIPPED | OUTPATIENT
Start: 2025-03-13 | End: 2025-03-13

## 2025-03-13 RX ORDER — DEXTROAMPHETAMINE SACCHARATE, AMPHETAMINE ASPARTATE, DEXTROAMPHETAMINE SULFATE AND AMPHETAMINE SULFATE 3.75; 3.75; 3.75; 3.75 MG/1; MG/1; MG/1; MG/1
15 TABLET ORAL 2 TIMES DAILY
Qty: 60 TABLET | Refills: 0 | Status: SHIPPED | OUTPATIENT
Start: 2025-03-13 | End: 2026-03-13

## 2025-03-13 NOTE — TELEPHONE ENCOUNTER
Patient called in states that he thought we was going to increase 15 mg BID this month. Advised Patient he had left a message with  this morning he said yes I know but I thought we was going to increase. Please advise if I need to cancel order I can.

## 2025-03-13 NOTE — TELEPHONE ENCOUNTER
We can increase to 15 mg. I thought he said he was doing good on the 10 mg and wanted to stay there and needed a refill. If you want to cancel it and send it back over to me, I will refill for the 15 mg. Thanks!

## 2025-03-26 ENCOUNTER — TELEMEDICINE (OUTPATIENT)
Dept: PSYCHIATRY | Facility: CLINIC | Age: 40
End: 2025-03-26
Payer: COMMERCIAL

## 2025-03-26 DIAGNOSIS — F90.2 ATTENTION DEFICIT HYPERACTIVITY DISORDER, COMBINED TYPE: Primary | ICD-10-CM

## 2025-03-26 DIAGNOSIS — F41.1 GENERALIZED ANXIETY DISORDER: ICD-10-CM

## 2025-03-26 NOTE — PROGRESS NOTES
Video Visit      Patient Name: Evan Drew  : 1985   MRN: [id number]     Referring Provider: Provider, No Known    Chief Complaint:      ICD-10-CM ICD-9-CM   1. Attention deficit hyperactivity disorder, combined type  F90.2 314.01   2. Generalized anxiety disorder  F41.1 300.02            This provider is located at the [facility] (through [facility]), [address], [place] [address] using a secure [employer] Video Visit through [employer]. Patient is being seen remotely via telehealth video visit at their home address in [place], and stated they are in a secure environment for this session. The patient's condition being diagnosed/treated is appropriate for telemedicine. The provider identified herself as well as her credentials. The patient, and/or patients guardian, consent to be seen remotely, and when consent is given they understand that the consent allows for patient identifiable information to be sent to a third party as needed. They may refuse to be seen remotely at any time. The electronic data is encrypted and password protected, and the patient and/or guardian has been advised of the potential risks to privacy not withstanding such measures.    The patient has chosen to receive care today through a telehealth video visit. Do you consent to use a video/audio connection for your medical care today? Yes    History of Present Illness:   Evan Drew is a 39 y.o. male who is being seen by a video visit today for medication symptom management.  Patient states he is doing much better on the Adderall.  He states he is able to focus and concentrate better and the best thing is his anxiety is not as bad or as frequent as it was on the Jornay.  He states when he was taken the Jornay he was not able to control his worry and now he feels like he can.  He is still rubbing his finger and thumb together at times when he gets anxious but he  states this has significantly improved.  He was in better spirits today and was laughing and seemed to be relaxed overall.  He reports he is taking his first dose of Adderall of the morning and then he takes his second dose at lunchtime.  He states this is working well for him.  He denies any side effects or adverse reactions to his medication.  He denies having any chest pain, shortness of breath, dizziness, and/or heart palpitations.  He reports his appetite is normal and he is trying to stay hydrated as well.  Patient also states he is sleeping well and actually feels more rested since taking Adderall instead of Jornay.  He feels that the Adderall 15 mg twice daily is a proper dose for him and is working very well.  He states he just feels so much better overall.  He is requesting for his medication not to be changed at this time.  His medication will be continued and refilled as needed. He is instructed to continue to have good appetite and stay hydrated as well as a good exercise regimen to help improve his overall mental physical wellbeing.  He was instructed if he has any new or worsening symptoms to notify this provider.  He was instructed with any thoughts of self-harm or suicidal ideations to go directly to the nearest emergency room.  He denies any current or recent SI/HI/AVH.  Patient verbalized understanding to all instruction. He will follow up with this provider in 3 months or sooner if needed for medication symptom management.    Subjective      Review of Systems:   Review of Systems   Psychiatric/Behavioral:  Positive for decreased concentration, positive for hyperactivity and stress. The patient is nervous/anxious.        Screening Scores:   PHQ-9 Total Score: (Patient-Rptd) 0  TERI-7  Feeling nervous, anxious or on edge: (Patient-Rptd) Not at all  Not being able to stop or control worrying: (Patient-Rptd) Not at all  Worrying too much about different things: (Patient-Rptd) Not at all  Trouble  Relaxing: (Patient-Rptd) Not at all  Being so restless that it is hard to sit still: (Patient-Rptd) Not at all  Feeling afraid as if something awful might happen: (Patient-Rptd) Not at all  Becoming easily annoyed or irritable: (Patient-Rptd) Not at all  TERI 7 Total Score: (Patient-Rptd) 0  If you checked any problems, how difficult have these problems made it for you to do your work, take care of things at home, or get along with other people: (Patient-Rptd) Not difficult at all      RISK ASSESSMENT:  Patient denies any thoughts of suicide or intent today. Patient denies any suicidal or homicidal ideation today. Patient denies any high risk factors today.     Medications:     Current Outpatient Medications:     amphetamine-dextroamphetamine (Adderall) 15 MG tablet, Take 1 tablet by mouth 2 (Two) Times a Day., Disp: 60 tablet, Rfl: 0    Medication Considerations:  JANET reviewed and appropriate.      Allergies:   No Known Allergies    Objective     Physical Exam:  Vital Signs: There were no vitals filed for this visit.  There is no height or weight on file to calculate BMI.     Mental Status Exam:   MENTAL STATUS EXAM   General Appearance:  Cleanly groomed and dressed  Eye Contact:  Good eye contact  Attitude:  Cooperative  Speech:  Normal rate, tone, volume  Language:  Spontaneous  Mood and affect:  Normal, pleasant  Hopelessness:  Denies  Loneliness: Denies  Thought Process:  Logical  Associations/ Thought Content:  No delusions  Hallucinations:  None  Suicidal Ideations:  Not present  Homicidal Ideation:  Not present  Sensorium:  Alert  Orientation:  Person, place, time and situation  Immediate Recall, Recent, and Remote Memory:  Intact  Attention Span/ Concentration:  Easily distracted  Fund of Knowledge:  Appropriate for age and educational level  Intellectual Functioning:  Average range  Insight:  Good  Judgement:  Good  Reliability:  Good  Impulse Control:  Good         Assessment / Plan      Visit  Diagnosis/Orders Placed This Visit:  Diagnoses and all orders for this visit:    1. Attention deficit hyperactivity disorder, combined type (Primary)    2. Generalized anxiety disorder             Functional Status: Moderate impairment    Prognosis: Fair with ongoing treatment    Impression/Formulation:  Patient appeared alert and oriented.  Patient is voluntarily requesting to begin outpatient therapy at [facility].  Patient is receptive to assistance with maintaining a stable lifestyle.  Patient presents with history of     ICD-10-CM ICD-9-CM   1. Attention deficit hyperactivity disorder, combined type  F90.2 314.01   2. Generalized anxiety disorder  F41.1 300.02       .     Treatment Plan:   -Continue Adderall 15 mg twice daily, refill not due at this time  -Follow-up in 3 months      The JANET report, reviewed through PDMP, of the past 12 months were reviewed and is appropriate.  The patient/guardian reports taking the medication only as prescribed.  The patient/guardian denies any abuse or misuse of the medication.  The patient/guardian denies any other substance use or issues.  There are no apparent substance related issues.  The patient reports no side effects of the current medication usage.  The patient/guardian has reported significant improvement with medication usage and wishes to continue medication as prescribed.  The patient/guardian is appropriate to continue with current medication usage at this time.  Reinforced risks and side effects of medication usage, patient and/or guardian verbalize understanding in their own words and are in agreement with current plan.    The patient is being prescribed a controlled substance as part of the treatment plan. The patient/guardian has been educated of appropriate use of the medication(s), including risks and side effects such as insomnia, headache, exacerbation of tics, nervousness, irritability, overstimulation, tremor, dizziness,  anorexia or change in appetite, nausea, dry mouth, constipation, diarrhea, weight loss, sexual dysfunction, psychotic episodes, seizures, palpitations, tachycardia, hypertension, rare activation (activation of hypomania, francisco, and/or suicidal ideations), cardiovascular adverse effects including sudden death (especially patients with pre-existing structural abnormalities often associated with a family history of cardiac disease), may slow normal growth in children, weight gain is reported but not expected, sedation is possible but activation is much more common, metabolic adversities, and overdose among others. Patient/guardian is also informed that the medication is to be used by the patient only, the medication is to be used only as directed, and the medication should not be combined with other substances unless directed by a Provider/Prescriber. The patient/guardian verbalized understanding and agreement with this in their own words, and wish to continue with current treatment plan.    GOALS:  Short Term Goals: Patient will be compliant with medication, and patient will have no significant medication related side effects.  Patient will be engaged in psychotherapy as indicated.  Patient will report subjective improvement of symptoms.  Long term goals: To stabilize mood and treat/improve subjective symptoms, the patient will stay out of the hospital, the patient will be at an optimal level of functioning, and the patient will take all medications as prescribed.  The patient/guardian verbalized understanding and agreement with goals that were mutually set.     Patient will continue supportive psychotherapy efforts and medications as indicated. Clinic will obtain release of information for current treatment team for continuity of care as needed. Patient will contact this office, call 911 or present to the nearest emergency room should suicidal or homicidal ideations occur. Discussed medication options and treatment  plan of prescribed medication(s) as well as the risks, benefits, and potential side effects. Patient ackowledged and verbally consented to continue with current treatment plan and was educated on the importance of compliance with treatment and follow-up appointments.         Follow Up:   Return in about 3 months (around 6/26/2025) for Med Check.      JENNIFER Marmolejo  [facility]     This is electronically signed by JENNIFER Marmolejo  03/26/2025 14:48 EDT    Mode of Visit: Video   Location of patient: -HOME-   Location of provider: [facility]   You have chosen to receive care through a telehealth visit.   The patient has signed the video visit consent form.   The visit included audio and video interaction. No technical issues occurred during this visit.     Part of this note may be an electronic transcription/translation of spoken language to printed text using the Dragon Dictation System.

## 2025-04-11 DIAGNOSIS — F90.2 ATTENTION DEFICIT HYPERACTIVITY DISORDER, COMBINED TYPE: ICD-10-CM

## 2025-04-11 RX ORDER — DEXTROAMPHETAMINE SACCHARATE, AMPHETAMINE ASPARTATE, DEXTROAMPHETAMINE SULFATE AND AMPHETAMINE SULFATE 3.75; 3.75; 3.75; 3.75 MG/1; MG/1; MG/1; MG/1
15 TABLET ORAL 2 TIMES DAILY
Qty: 60 TABLET | Refills: 0 | Status: SHIPPED | OUTPATIENT
Start: 2025-04-11 | End: 2026-04-11

## 2025-05-09 ENCOUNTER — TELEPHONE (OUTPATIENT)
Dept: PSYCHIATRY | Facility: CLINIC | Age: 40
End: 2025-05-09
Payer: COMMERCIAL

## 2025-05-09 DIAGNOSIS — F90.2 ATTENTION DEFICIT HYPERACTIVITY DISORDER, COMBINED TYPE: ICD-10-CM

## 2025-05-09 NOTE — TELEPHONE ENCOUNTER
Rx Refill Note  Requested Prescriptions     Pending Prescriptions Disp Refills    amphetamine-dextroamphetamine (Adderall) 15 MG tablet 60 tablet 0     Sig: Take 1 tablet by mouth 2 (Two) Times a Day.      Last office visit with prescribing clinician: 9/9/2024   Last telemedicine visit with prescribing clinician: 3/26/2025   Next office visit with prescribing clinician: 6/19/2025                         Would you like a call back once the refill request has been completed: [] Yes [] No    If the office needs to give you a call back, can they leave a voicemail: [] Yes [] No    Fred Diaz MA  05/09/25, 17:08 EDT

## 2025-05-12 RX ORDER — DEXTROAMPHETAMINE SACCHARATE, AMPHETAMINE ASPARTATE, DEXTROAMPHETAMINE SULFATE AND AMPHETAMINE SULFATE 3.75; 3.75; 3.75; 3.75 MG/1; MG/1; MG/1; MG/1
15 TABLET ORAL 2 TIMES DAILY
Qty: 60 TABLET | Refills: 0 | Status: SHIPPED | OUTPATIENT
Start: 2025-05-12 | End: 2026-05-12

## 2025-06-06 ENCOUNTER — TELEPHONE (OUTPATIENT)
Dept: PSYCHIATRY | Facility: CLINIC | Age: 40
End: 2025-06-06
Payer: COMMERCIAL

## 2025-06-06 DIAGNOSIS — F90.2 ATTENTION DEFICIT HYPERACTIVITY DISORDER, COMBINED TYPE: ICD-10-CM

## 2025-06-06 RX ORDER — DEXTROAMPHETAMINE SACCHARATE, AMPHETAMINE ASPARTATE, DEXTROAMPHETAMINE SULFATE AND AMPHETAMINE SULFATE 3.75; 3.75; 3.75; 3.75 MG/1; MG/1; MG/1; MG/1
15 TABLET ORAL 2 TIMES DAILY
Qty: 60 TABLET | Refills: 0 | Status: SHIPPED | OUTPATIENT
Start: 2025-06-06 | End: 2026-06-06

## 2025-06-06 NOTE — TELEPHONE ENCOUNTER
Patient requesting refill on Adderall.  Patient request it be sent to [facility] in [place].      Rx Refill Note  Requested Prescriptions     Pending Prescriptions Disp Refills    amphetamine-dextroamphetamine (Adderall) 15 MG tablet 60 tablet 0     Sig: Take 1 tablet by mouth 2 (Two) Times a Day.      Last office visit with prescribing clinician: 9/9/2024   Last telemedicine visit with prescribing clinician: 3/26/2025   Next office visit with prescribing clinician: 6/19/2025                         Would you like a call back once the refill request has been completed: [] Yes [] No    If the office needs to give you a call back, can they leave a voicemail: [] Yes [] No    Jessenia Rolon MA  06/06/25, 13:45 EDT

## 2025-06-19 ENCOUNTER — TELEMEDICINE (OUTPATIENT)
Dept: PSYCHIATRY | Facility: CLINIC | Age: 40
End: 2025-06-19
Payer: COMMERCIAL

## 2025-06-19 DIAGNOSIS — F41.1 GENERALIZED ANXIETY DISORDER: ICD-10-CM

## 2025-06-19 DIAGNOSIS — Z79.899 MEDICATION MANAGEMENT: ICD-10-CM

## 2025-06-19 DIAGNOSIS — F90.2 ATTENTION DEFICIT HYPERACTIVITY DISORDER, COMBINED TYPE: Primary | ICD-10-CM

## 2025-06-19 NOTE — PROGRESS NOTES
Video Visit      Patient Name: Evan Drew  : 1985   MRN: [id number]     Referring Provider: Provider, No Known    Chief Complaint:      ICD-10-CM ICD-9-CM   1. Attention deficit hyperactivity disorder, combined type  F90.2 314.01   2. Generalized anxiety disorder  F41.1 300.02   3. Medication management  Z79.899 V58.69        This provider is located at the [facility] (through [facility]), [address] [place] [address], [facility] [place] [address], [facility] [address] using a secure [employer] Video Visit through [employer]. Patient is being seen remotely via telehealth video visit at their home address in [place], and stated they are in a secure environment for this session. The patient's condition being diagnosed/treated is appropriate for telemedicine. The provider identified herself as well as her credentials. The patient, and/or patients guardian, consent to be seen remotely, and when consent is given they understand that the consent allows for patient identifiable information to be sent to a third party as needed. They may refuse to be seen remotely at any time. The electronic data is encrypted and password protected, and the patient and/or guardian has been advised of the potential risks to privacy not withstanding such measures.    The patient has chosen to receive care today through a telehealth video visit. Do you consent to use a video/audio connection for your medical care today? Yes  History of Present Illness  Evan Drew is a 39 y.o. male who is being seen by a video visit today for medication and symptom management.    He reports a positive response to his current medication regimen, with no adverse effects noted.  He is currently taking Adderall 15 mg tablet twice daily for his ADHD.  He expresses satisfaction with the treatment and does not perceive a need for any modifications. His anxiety symptoms have significantly improved since taking Adderall.  His irritability  has resolved.  He is not experiencing and has not experienced any chest pain, dizziness, shortness of breath, or heart palpitations since being on Adderall. He maintains adequate hydration and has a normal appetite. He does not endorse any feelings of depression.  He also reports he is sleeping well with no issues and has been getting up for work and is doing better.  He also reports that since switching to Adderall his relationship with his girlfriend is also doing better due to him not being so anxious and irritable all the time.  Overall, patient states he is doing much better and would like to continue on his Adderall 15 mg twice daily.  Patient is also aware that he is due for another urine drug screen by September.  An order will be placed and patient is instructed to go have this completed prior to September 2025.  He verbalized understanding to this.    After we discussed medication symptom management, patient will be continued on his Adderall 15 mg twice daily.  He was instructed with any new or worsening symptoms to notify this provider.  He was instructed with any thoughts of self-harm or suicidal ideation with intent or plan to go directly to the emergency room.  He was instructed on adequate nutrition and hydration as well as adapting to a good exercise regimen to help improve his overall mental and physical wellbeing.  He verbalized understanding to all.  He denies any recent recurrent SI/HI/AVH.  He will follow up with this provider in 3 months or sooner if needed for medication symptom management.  Refills will be given as needed.        Subjective      Review of Systems:   Review of Systems   Psychiatric/Behavioral:  Positive for decreased concentration, positive for hyperactivity and stress.        Screening Scores:   PHQ-9 Total Score: (Patient-Rptd) 0  TERI-7  Feeling nervous, anxious or on edge: (Patient-Rptd) Not at all  Not being able to stop or control worrying: (Patient-Rptd) Not at  all  Worrying too much about different things: (Patient-Rptd) Not at all  Trouble Relaxing: (Patient-Rptd) Not at all  Being so restless that it is hard to sit still: (Patient-Rptd) Not at all  Feeling afraid as if something awful might happen: (Patient-Rptd) Not at all  Becoming easily annoyed or irritable: (Patient-Rptd) Not at all  ETRI 7 Total Score: (Patient-Rptd) 0  If you checked any problems, how difficult have these problems made it for you to do your work, take care of things at home, or get along with other people: (Patient-Rptd) Not difficult at all      RISK ASSESSMENT:  Patient denies any thoughts of suicide or intent today. Patient denies any suicidal or homicidal ideation today. Patient denies any high risk factors today.     Medications:     Current Outpatient Medications:     amphetamine-dextroamphetamine (Adderall) 15 MG tablet, Take 1 tablet by mouth 2 (Two) Times a Day., Disp: 60 tablet, Rfl: 0    Medication Considerations:  JANET reviewed and appropriate.      Allergies:   No Known Allergies    Objective     Physical Exam:  Vital Signs: There were no vitals filed for this visit.  There is no height or weight on file to calculate BMI.     Mental Status Exam:   MENTAL STATUS EXAM   General Appearance:  Cleanly groomed and dressed  Eye Contact:  Good eye contact  Attitude:  Cooperative  Speech:  Normal rate, tone, volume  Language:  Spontaneous  Mood and affect:  Normal, pleasant  Hopelessness:  Denies  Loneliness: Denies  Thought Process:  Logical  Associations/ Thought Content:  No delusions  Hallucinations:  None  Suicidal Ideations:  Not present  Homicidal Ideation:  Not present  Sensorium:  Alert  Orientation:  Person, place, time and situation  Immediate Recall, Recent, and Remote Memory:  Intact  Attention Span/ Concentration:  Easily distracted  Fund of Knowledge:  Appropriate for age and educational level  Intellectual Functioning:  Average range  Insight:  Good  Judgement:   Good  Reliability:  Good  Impulse Control:  Good         Assessment / Plan      Visit Diagnosis/Orders Placed This Visit:  Diagnoses and all orders for this visit:    1. Attention deficit hyperactivity disorder, combined type (Primary)    2. Generalized anxiety disorder    3. Medication management  -     Compliance Drug Analysis, Ur - Urine, Clean Catch; Future         Functional Status: Mild impairment     Prognosis: Good with Ongoing Treatment     Impression/Formulation:  Patient appeared alert and oriented.  Patient is voluntarily requesting to begin outpatient therapy at [facility].  Patient is receptive to assistance with maintaining a stable lifestyle.  Patient presents with history of     ICD-10-CM ICD-9-CM   1. Attention deficit hyperactivity disorder, combined type  F90.2 314.01   2. Generalized anxiety disorder  F41.1 300.02   3. Medication management  Z79.899 V58.69   .     Treatment Plan:   -Continue Adderall 15 mg twice daily, last refill given on 6/10/2025  -Compliance drug analysis order placed in to be collected prior to September 2025  -Follow-up in 3 months      The JANET report, reviewed through PDMP, of the past 12 months were reviewed and is appropriate.  The patient/guardian reports taking the medication only as prescribed.  The patient/guardian denies any abuse or misuse of the medication.  The patient/guardian denies any other substance use or issues.  There are no apparent substance related issues.  The patient reports no side effects of the current medication usage.  The patient/guardian has reported significant improvement with medication usage and wishes to continue medication as prescribed.  The patient/guardian is appropriate to continue with current medication usage at this time.  Reinforced risks and side effects of medication usage, patient and/or guardian verbalize understanding in their own words and are in agreement with current plan.    The patient is  being prescribed a controlled substance as part of the treatment plan. The patient/guardian has been educated of appropriate use of the medication(s), including risks and side effects such as insomnia, headache, exacerbation of tics, nervousness, irritability, overstimulation, tremor, dizziness, anorexia or change in appetite, nausea, dry mouth, constipation, diarrhea, weight loss, sexual dysfunction, psychotic episodes, seizures, palpitations, tachycardia, hypertension, rare activation (activation of hypomania, francisoc, and/or suicidal ideations), cardiovascular adverse effects including sudden death (especially patients with pre-existing structural abnormalities often associated with a family history of cardiac disease), may slow normal growth in children, weight gain is reported but not expected, sedation is possible but activation is much more common, metabolic adversities, and overdose among others. Patient/guardian is also informed that the medication is to be used by the patient only, the medication is to be used only as directed, and the medication should not be combined with other substances unless directed by a Provider/Prescriber. The patient/guardian verbalized understanding and agreement with this in their own words, and wish to continue with current treatment plan.    GOALS:  Short Term Goals: Patient will be compliant with medication, and patient will have no significant medication related side effects.  Patient will be engaged in psychotherapy as indicated.  Patient will report subjective improvement of symptoms.  Long term goals: To stabilize mood and treat/improve subjective symptoms, the patient will stay out of the hospital, the patient will be at an optimal level of functioning, and the patient will take all medications as prescribed.  The patient/guardian verbalized understanding and agreement with goals that were mutually set.     Patient will continue supportive psychotherapy efforts and  medications as indicated. Clinic will obtain release of information for current treatment team for continuity of care as needed. Patient will contact this office, call 911 or present to the nearest emergency room should suicidal or homicidal ideations occur. Discussed medication options and treatment plan of prescribed medication(s) as well as the risks, benefits, and potential side effects. Patient ackowledged and verbally consented to continue with current treatment plan and was educated on the importance of compliance with treatment and follow-up appointments.     Follow Up:   Return in about 3 months (around 9/19/2025) for Med Check.      JENNIFER Marmolejo  [facility]     This is electronically signed by JENNIFER Marmolejo  06/19/2025 16:22 EDT    Mode of Visit: Video   Location of patient: -HOME-   Location of provider: +[facility]+   You have chosen to receive care through a telehealth visit.   The patient has signed the video visit consent form.   The visit included audio and video interaction. No technical issues occurred during this visit.     Patient or patient representative verbalized consent for the use of Ambient Listening during the visit with  JENNIFER Bishop for chart documentation. 6/19/2025  16:22 EDT      Part of this note may be an electronic transcription/translation of spoken language to printed text using the Dragon Dictation System.

## 2025-07-09 DIAGNOSIS — F90.2 ATTENTION DEFICIT HYPERACTIVITY DISORDER, COMBINED TYPE: ICD-10-CM

## 2025-07-09 NOTE — TELEPHONE ENCOUNTER
Rx Refill Note  Requested Prescriptions     Pending Prescriptions Disp Refills    amphetamine-dextroamphetamine (Adderall) 15 MG tablet 60 tablet 0     Sig: Take 1 tablet by mouth 2 (Two) Times a Day.      Last office visit with prescribing clinician: 9/9/2024   Last telemedicine visit with prescribing clinician: 6/19/2025   Next office visit with prescribing clinician: 9/18/2025                         Would you like a call back once the refill request has been completed: [] Yes [] No    If the office needs to give you a call back, can they leave a voicemail: [] Yes [] No    Fred Diaz MA  07/09/25, 17:55 EDT

## 2025-07-10 RX ORDER — DEXTROAMPHETAMINE SACCHARATE, AMPHETAMINE ASPARTATE, DEXTROAMPHETAMINE SULFATE AND AMPHETAMINE SULFATE 3.75; 3.75; 3.75; 3.75 MG/1; MG/1; MG/1; MG/1
15 TABLET ORAL 2 TIMES DAILY
Qty: 60 TABLET | Refills: 0 | Status: SHIPPED | OUTPATIENT
Start: 2025-07-10 | End: 2026-07-10

## 2025-08-07 DIAGNOSIS — F90.2 ATTENTION DEFICIT HYPERACTIVITY DISORDER, COMBINED TYPE: ICD-10-CM

## 2025-08-07 RX ORDER — DEXTROAMPHETAMINE SACCHARATE, AMPHETAMINE ASPARTATE, DEXTROAMPHETAMINE SULFATE AND AMPHETAMINE SULFATE 3.75; 3.75; 3.75; 3.75 MG/1; MG/1; MG/1; MG/1
15 TABLET ORAL 2 TIMES DAILY
Qty: 60 TABLET | Refills: 0 | Status: SHIPPED | OUTPATIENT
Start: 2025-08-07 | End: 2026-08-07